# Patient Record
Sex: MALE | Race: WHITE | Employment: OTHER | URBAN - METROPOLITAN AREA
[De-identification: names, ages, dates, MRNs, and addresses within clinical notes are randomized per-mention and may not be internally consistent; named-entity substitution may affect disease eponyms.]

---

## 2021-08-22 ENCOUNTER — APPOINTMENT (OUTPATIENT)
Dept: GENERAL RADIOLOGY | Facility: HOSPITAL | Age: 46
End: 2021-08-22

## 2021-08-22 ENCOUNTER — HOSPITAL ENCOUNTER (EMERGENCY)
Facility: HOSPITAL | Age: 46
Discharge: HOME | End: 2021-08-22

## 2021-08-22 VITALS
RESPIRATION RATE: 18 BRPM | TEMPERATURE: 98 F | SYSTOLIC BLOOD PRESSURE: 138 MMHG | WEIGHT: 200 LBS | BODY MASS INDEX: 26.51 KG/M2 | HEIGHT: 73 IN | OXYGEN SATURATION: 99 % | HEART RATE: 80 BPM | DIASTOLIC BLOOD PRESSURE: 73 MMHG

## 2021-08-22 DIAGNOSIS — S93.401A SEVERE SPRAIN OF RIGHT ANKLE, INITIAL ENCOUNTER: Primary | ICD-10-CM

## 2021-08-22 PROCEDURE — 73610 X-RAY EXAM OF ANKLE: CPT

## 2021-08-22 PROCEDURE — 99283 EMERGENCY DEPT VISIT LOW MDM: CPT

## 2021-08-22 NOTE — ED PROVIDER NOTES
Patient Seen in: [facility] Emergency Department      History   Patient presents with:  Leg or Foot Injury    Stated Complaint: right ankle pain    HPI/Subjective:   HPI    Right ankle pain after rolled ankle yesterday   Did not hear a pop   Today b significant pain across the lateral malleolus   Skin:     General: Skin is warm and dry. Capillary Refill: Capillary refill takes less than 2 seconds. Coloration: Skin is not jaundiced or pale. Findings: Bruising present.  No erythema, lesion an appointment as soon as possible for a visit            Medications Prescribed:  There are no discharge medications for this patient.